# Patient Record
Sex: MALE | Race: BLACK OR AFRICAN AMERICAN | NOT HISPANIC OR LATINO | Employment: UNEMPLOYED | URBAN - METROPOLITAN AREA
[De-identification: names, ages, dates, MRNs, and addresses within clinical notes are randomized per-mention and may not be internally consistent; named-entity substitution may affect disease eponyms.]

---

## 2017-02-23 ENCOUNTER — OFFICE VISIT (OUTPATIENT)
Dept: NEUROLOGY | Facility: MEDICAL CENTER | Age: 32
End: 2017-02-23
Payer: OTHER GOVERNMENT

## 2017-02-23 VITALS
HEIGHT: 67 IN | DIASTOLIC BLOOD PRESSURE: 70 MMHG | SYSTOLIC BLOOD PRESSURE: 110 MMHG | TEMPERATURE: 98.6 F | HEART RATE: 87 BPM | WEIGHT: 175 LBS | OXYGEN SATURATION: 99 % | BODY MASS INDEX: 27.47 KG/M2

## 2017-02-23 PROCEDURE — 64615 CHEMODENERV MUSC MIGRAINE: CPT | Performed by: PSYCHIATRY & NEUROLOGY

## 2017-02-23 ASSESSMENT — ENCOUNTER SYMPTOMS: MYALGIAS: 1

## 2017-02-23 NOTE — PROGRESS NOTES
"Subjective:      [facility] is a 32 y.o. male who presents for follow-up and for his next series of Botox injections, also with questions about testing for his generalized complaints of pain.     HPI    Patient is been followed here on a regular basis for chronic migraine, he is not receiving his ninth dose of Botox injections. The headaches themselves remain with a greater than 7 days/month improvement in frequency. She still has severe headaches that can incapacitate for upwards of 4 hours a day. He has tolerated Botox injections in the past. They have reduced the interval between injections to about 10 weeks. He is not on maintenance therapies, he maintains when necessary treatment of headaches with Treximet tablets available through the [facility] pharmacy.    He still has the generalized pain symptoms, he saw Dr. Spears 3 months ago. Though MRA of the brain, MRI of the brain and MRV had been ordered because of a history of venous sinus thrombosis, the orders evidently were never completed. He also ordered some blood work as it pertains to a Prothrombogenic state. This too was never obtained.    Medical, surgical and family histories are reviewed otherwise in the electronic health record, there are no new drug allergies. He has Benadryl 25 mg, Flexeril 5 mg, Treximet tablets and naproxen 250 mg available to him.    Review of Systems   Musculoskeletal: Positive for myalgias.        Objective:     /70 mmHg  Pulse 87  Temp(Src) 37 °C (98.6 °F)  Ht 1.702 m (5' 7.01\")  Wt 79.379 kg (175 lb)  BMI 27.40 kg/m2  SpO2 99%     Physical Exam    He appears in some mild distress, again complaining of headache. He is cooperative. Vital signs are stable. There is no malar rash or jaw claudication. The neck is supple, there is tenderness of the cervical paraspinal and trapezius muscle bodies bilaterally. There is no spasm. There is no scalp allodynia.    Including mental status, cranial nerves, motor, coordination, " and sensory evaluations, the examination was performed in only quick and cursory fashion by observation, revealing no deficits.    As per FDA protocol with chronic migraine, a total of 155 units of botulinum toxin a injection was used, at #5 units/site, at #31 sites, over , procerus, frontalis, temporalis, occipitalis, cervical paraspinal and trapezius muscle bodies bilaterally. Though there was a little discomfort, he tolerated the procedures well. As per FDA protocol, a total of 45 units was discarded appropriately.     Assessment/Plan:     1. Chronic migraine  He will follow-up in another 3 months with Dr. Spears for his next series of injections and, hopefully, review of his generalized pain symptoms and test results because the pain was decreasing with it that it doesn't say that he could talk with our talk as well as static wine just made the pain did dissipate follows up. Repeat orders for imaging and blood work were also made, hopefully these can be completed prior to his next office visit.    - onabotulinum toxin type A SOLR 200 Units; 200 Units by Injection route Once.  - VITAMIN B12; Future  - CBC WITH DIFFERENTIAL; Future  - BASIC METABOLIC PANEL; Future  - WESTERGREN SED RATE; Future  - C-REACTIVE PROTEIN CARDIAC  - ANTI-NUCLEAR ANTIBODY SERUM; Future  - CRYOGLOBULIN QL SERUM RFLX; Future  - ANTICARDIOLIPIN AB IGG,IGM,IGA; Future  - VITAMIN 1,25 DIHYDROXY; Future  - IRON/TOTAL IRON BIND; Future  - TSH+FREE T4  - MR-VENOGRAM (MRV) HEAD; Future  - MR-BRAIN-W/O; Future  - MR-MRA HEAD-W/O; Future    Time: Evaluation 20 minutes for exam, review, discussion, and education  Discussion: As mentioned in the assessment, over 50% of the time spent face-to-face counseling and coordinating care

## 2017-02-23 NOTE — MR AVS SNAPSHOT
"        Jose Ashley   2017 9:20 AM   Office Visit   MRN: [id number]    Department:  [facility]   Dept Phone:  [phone]    Description:  Male : 1985   Provider:  Jj Hollis M.D.           Reason for Visit     Follow-Up Chonic Migriane 9 Botox      Allergies as of 2017     No Known Allergies      You were diagnosed with     Chronic migraine   [[id number]]  -  Primary       Vital Signs     Blood Pressure Pulse Temperature Height Weight Body Mass Index    110/70 mmHg 87 37 °C (98.6 °F) 1.702 m (5' 7.01\") 79.379 kg (175 lb) 27.40 kg/m2    Oxygen Saturation Smoking Status                99% Former Smoker          Basic Information     Date Of Birth Sex Race Ethnicity Preferred Language    1985 Male Black or  Non- English      Your appointments     May 23, 2017  9:00 AM   BOTOX with Jj Hollis M.D.   [facility] (--)    [address] [place] [address], [address]  [facility] [id number] [phone]              Problem List              ICD-10-CM Priority Class Noted - Resolved    Chronic migraine G43.709   11/10/2014 - Present    Cerebral venous sinus thrombosis G08   2016 - Present    Family history of hypercoagulability Z83.2   2016 - Present      Health Maintenance        Date Due Completion Dates    IMM DTaP/Tdap/Td Vaccine (1 - Tdap) 2002 ---    IMM INFLUENZA (1) 2016 ---            Current Immunizations     No immunizations on file.      Below and/or attached are the medications your provider expects you to take. Review all of your home medications and newly ordered medications with your provider and/or pharmacist. Follow medication instructions as directed by your provider and/or pharmacist. Please keep your medication list with you and share with your provider. Update the information when medications are discontinued, doses are changed, or new medications (including over-the-counter products) are added; and carry " medication information at all times in the event of emergency situations     Allergies:  No Known Allergies          Medications  Valid as of: February 23, 2017 -  9:29 AM    Generic Name Brand Name Tablet Size Instructions for use    Cyclobenzaprine HCl (Tab) FLEXERIL 5 MG Take one tablet po BID PRN moderate muscle spasm pain.  Do not exceed more than 2 tablets in 24 hours or more than 3 consecutive days of taking the Flexeril.        DiphenhydrAMINE HCl (Tab) BENADRYL 25 MG Take 1-2 tablets po prn both migraine and insomnia.  Do not exceed more than 2 tablets in 24 hours.        Naproxen (Tab) NAPROSYN 250 MG Take 250 mg by mouth 2 times a day, with meals.        Sumatriptan-Naproxen Sodium (Tab) Sumatriptan-Naproxen Sodium  MG Take 1/2-1 tablet po at onset of headache, may repeat dose in 2 hours if unrelieved.  Do not exceed more than 2 tablets in 24 hours.        .                 Medicines prescribed today were sent to:     None      Medication refill instructions:       If your prescription bottle indicates you have medication refills left, it is not necessary to call your provider’s office. Please contact your pharmacy and they will refill your medication.    If your prescription bottle indicates you do not have any refills left, you may request refills at any time through one of the following ways: The online [employer] system (except Urgent Care), by calling your provider’s office, or by asking your pharmacy to contact your provider’s office with a refill request. Medication refills are processed only during regular business hours and may not be available until the next business day. Your provider may request additional information or to have a follow-up visit with you prior to refilling your medication.   *Please Note: Medication refills are assigned a new Rx number when refilled electronically. Your pharmacy may indicate that no refills were authorized even though a new prescription for the same  medication is available at the pharmacy. Please request the medicine by name with the pharmacy before contacting your provider for a refill.        Your To Do List     Future Labs/Procedures Complete By Expires    ANTI-NUCLEAR ANTIBODY SERUM  As directed 2/23/2018    ANTICARDIOLIPIN AB IGG,IGM,IGA  As directed 2/23/2018    BASIC METABOLIC PANEL  As directed 2/23/2018    CBC WITH DIFFERENTIAL  As directed 2/23/2018    CRYOGLOBULIN QL SERUM RFLX  As directed 2/23/2018    IRON/TOTAL IRON BIND  As directed 2/23/2018    MR-BRAIN-W/O  As directed 2/23/2018    MR-MRA HEAD-W/O  As directed 2/23/2018    MR-VENOGRAM (MRV) HEAD  As directed 2/23/2018    VITAMIN 1,25 DIHYDROXY  As directed 2/23/2018    VITAMIN B12  As directed 2/23/2018    WESTERGREN SED RATE  As directed 8/24/2017         MyChart Status: Patient Declined